# Patient Record
Sex: MALE | Race: ASIAN | NOT HISPANIC OR LATINO | URBAN - METROPOLITAN AREA
[De-identification: names, ages, dates, MRNs, and addresses within clinical notes are randomized per-mention and may not be internally consistent; named-entity substitution may affect disease eponyms.]

---

## 2023-02-20 ENCOUNTER — EMERGENCY (EMERGENCY)
Facility: HOSPITAL | Age: 53
LOS: 0 days | Discharge: HOME | End: 2023-02-20
Payer: MEDICAID

## 2023-02-20 VITALS
TEMPERATURE: 98 F | HEART RATE: 89 BPM | SYSTOLIC BLOOD PRESSURE: 144 MMHG | DIASTOLIC BLOOD PRESSURE: 94 MMHG | RESPIRATION RATE: 16 BRPM | OXYGEN SATURATION: 100 %

## 2023-02-20 VITALS
OXYGEN SATURATION: 97 % | HEART RATE: 109 BPM | DIASTOLIC BLOOD PRESSURE: 93 MMHG | RESPIRATION RATE: 16 BRPM | WEIGHT: 132.94 LBS | SYSTOLIC BLOOD PRESSURE: 134 MMHG | HEIGHT: 62 IN | TEMPERATURE: 98 F

## 2023-02-20 DIAGNOSIS — R51.9 HEADACHE, UNSPECIFIED: ICD-10-CM

## 2023-02-20 DIAGNOSIS — Z91.013 ALLERGY TO SEAFOOD: ICD-10-CM

## 2023-02-20 DIAGNOSIS — R10.13 EPIGASTRIC PAIN: ICD-10-CM

## 2023-02-20 DIAGNOSIS — Z20.822 CONTACT WITH AND (SUSPECTED) EXPOSURE TO COVID-19: ICD-10-CM

## 2023-02-20 DIAGNOSIS — I10 ESSENTIAL (PRIMARY) HYPERTENSION: ICD-10-CM

## 2023-02-20 DIAGNOSIS — R11.2 NAUSEA WITH VOMITING, UNSPECIFIED: ICD-10-CM

## 2023-02-20 DIAGNOSIS — Z86.79 PERSONAL HISTORY OF OTHER DISEASES OF THE CIRCULATORY SYSTEM: ICD-10-CM

## 2023-02-20 DIAGNOSIS — Z86.59 PERSONAL HISTORY OF OTHER MENTAL AND BEHAVIORAL DISORDERS: ICD-10-CM

## 2023-02-20 DIAGNOSIS — R79.89 OTHER SPECIFIED ABNORMAL FINDINGS OF BLOOD CHEMISTRY: ICD-10-CM

## 2023-02-20 LAB
ALBUMIN SERPL ELPH-MCNC: 4.3 G/DL — SIGNIFICANT CHANGE UP (ref 3.3–5)
ALP SERPL-CCNC: 85 U/L — SIGNIFICANT CHANGE UP (ref 40–120)
ALT FLD-CCNC: 125 U/L — HIGH (ref 12–78)
ANION GAP SERPL CALC-SCNC: 11 MMOL/L — SIGNIFICANT CHANGE UP (ref 5–17)
APTT BLD: 32.3 SEC — SIGNIFICANT CHANGE UP (ref 27.5–35.5)
AST SERPL-CCNC: 142 U/L — HIGH (ref 15–37)
BASOPHILS # BLD AUTO: 0.02 K/UL — SIGNIFICANT CHANGE UP (ref 0–0.2)
BASOPHILS NFR BLD AUTO: 0.5 % — SIGNIFICANT CHANGE UP (ref 0–2)
BILIRUB SERPL-MCNC: 0.9 MG/DL — SIGNIFICANT CHANGE UP (ref 0.2–1.2)
BUN SERPL-MCNC: 11 MG/DL — SIGNIFICANT CHANGE UP (ref 7–23)
CALCIUM SERPL-MCNC: 10.1 MG/DL — SIGNIFICANT CHANGE UP (ref 8.5–10.1)
CHLORIDE SERPL-SCNC: 98 MMOL/L — SIGNIFICANT CHANGE UP (ref 96–108)
CO2 SERPL-SCNC: 28 MMOL/L — SIGNIFICANT CHANGE UP (ref 22–31)
CREAT SERPL-MCNC: 0.67 MG/DL — SIGNIFICANT CHANGE UP (ref 0.5–1.3)
EGFR: 112 ML/MIN/1.73M2 — SIGNIFICANT CHANGE UP
EOSINOPHIL # BLD AUTO: 0.01 K/UL — SIGNIFICANT CHANGE UP (ref 0–0.5)
EOSINOPHIL NFR BLD AUTO: 0.2 % — SIGNIFICANT CHANGE UP (ref 0–6)
FLUAV AG NPH QL: SIGNIFICANT CHANGE UP
FLUBV AG NPH QL: SIGNIFICANT CHANGE UP
GLUCOSE SERPL-MCNC: 87 MG/DL — SIGNIFICANT CHANGE UP (ref 70–99)
HCT VFR BLD CALC: 42.9 % — SIGNIFICANT CHANGE UP (ref 39–50)
HGB BLD-MCNC: 14.2 G/DL — SIGNIFICANT CHANGE UP (ref 13–17)
IMM GRANULOCYTES NFR BLD AUTO: 0.2 % — SIGNIFICANT CHANGE UP (ref 0–0.9)
INR BLD: 1.08 RATIO — SIGNIFICANT CHANGE UP (ref 0.88–1.16)
LYMPHOCYTES # BLD AUTO: 1.2 K/UL — SIGNIFICANT CHANGE UP (ref 1–3.3)
LYMPHOCYTES # BLD AUTO: 28.3 % — SIGNIFICANT CHANGE UP (ref 13–44)
MCHC RBC-ENTMCNC: 31.4 PG — SIGNIFICANT CHANGE UP (ref 27–34)
MCHC RBC-ENTMCNC: 33.1 G/DL — SIGNIFICANT CHANGE UP (ref 32–36)
MCV RBC AUTO: 94.9 FL — SIGNIFICANT CHANGE UP (ref 80–100)
MONOCYTES # BLD AUTO: 0.43 K/UL — SIGNIFICANT CHANGE UP (ref 0–0.9)
MONOCYTES NFR BLD AUTO: 10.1 % — SIGNIFICANT CHANGE UP (ref 2–14)
NEUTROPHILS # BLD AUTO: 2.57 K/UL — SIGNIFICANT CHANGE UP (ref 1.8–7.4)
NEUTROPHILS NFR BLD AUTO: 60.7 % — SIGNIFICANT CHANGE UP (ref 43–77)
NRBC # BLD: 0 /100 WBCS — SIGNIFICANT CHANGE UP (ref 0–0)
PLATELET # BLD AUTO: 126 K/UL — LOW (ref 150–400)
POTASSIUM SERPL-MCNC: 3.6 MMOL/L — SIGNIFICANT CHANGE UP (ref 3.5–5.3)
POTASSIUM SERPL-SCNC: 3.6 MMOL/L — SIGNIFICANT CHANGE UP (ref 3.5–5.3)
PROT SERPL-MCNC: 8.9 GM/DL — HIGH (ref 6–8.3)
PROTHROM AB SERPL-ACNC: 13 SEC — SIGNIFICANT CHANGE UP (ref 10.5–13.4)
RBC # BLD: 4.52 M/UL — SIGNIFICANT CHANGE UP (ref 4.2–5.8)
RBC # FLD: 12.1 % — SIGNIFICANT CHANGE UP (ref 10.3–14.5)
SARS-COV-2 RNA SPEC QL NAA+PROBE: SIGNIFICANT CHANGE UP
SODIUM SERPL-SCNC: 137 MMOL/L — SIGNIFICANT CHANGE UP (ref 135–145)
TROPONIN I, HIGH SENSITIVITY RESULT: 7.6 NG/L — SIGNIFICANT CHANGE UP
WBC # BLD: 4.24 K/UL — SIGNIFICANT CHANGE UP (ref 3.8–10.5)
WBC # FLD AUTO: 4.24 K/UL — SIGNIFICANT CHANGE UP (ref 3.8–10.5)

## 2023-02-20 PROCEDURE — 70496 CT ANGIOGRAPHY HEAD: CPT | Mod: 26,MA

## 2023-02-20 PROCEDURE — 99285 EMERGENCY DEPT VISIT HI MDM: CPT

## 2023-02-20 PROCEDURE — 93010 ELECTROCARDIOGRAM REPORT: CPT

## 2023-02-20 RX ORDER — METOCLOPRAMIDE HCL 10 MG
10 TABLET ORAL ONCE
Refills: 0 | Status: COMPLETED | OUTPATIENT
Start: 2023-02-20 | End: 2023-02-20

## 2023-02-20 RX ORDER — SODIUM CHLORIDE 9 MG/ML
1000 INJECTION, SOLUTION INTRAVENOUS ONCE
Refills: 0 | Status: COMPLETED | OUTPATIENT
Start: 2023-02-20 | End: 2023-02-20

## 2023-02-20 RX ORDER — FAMOTIDINE 10 MG/ML
20 INJECTION INTRAVENOUS ONCE
Refills: 0 | Status: COMPLETED | OUTPATIENT
Start: 2023-02-20 | End: 2023-02-20

## 2023-02-20 RX ADMIN — Medication 10 MILLIGRAM(S): at 17:19

## 2023-02-20 RX ADMIN — SODIUM CHLORIDE 1000 MILLILITER(S): 9 INJECTION, SOLUTION INTRAVENOUS at 17:19

## 2023-02-20 RX ADMIN — FAMOTIDINE 20 MILLIGRAM(S): 10 INJECTION INTRAVENOUS at 17:20

## 2023-02-20 NOTE — ED PROVIDER NOTE - PATIENT PORTAL LINK FT
You can access the FollowMyHealth Patient Portal offered by [facility] by registering at the following website: [email or web address]. By joining [employer]’s FollowMyHealth portal, you will also be able to view your health information using other applications (apps) compatible with our system.

## 2023-02-20 NOTE — ED ADULT TRIAGE NOTE - HEIGHT IN CM
Verbal order received from Dr. Kenna Mcgovern to hold 2units of Humalog for ZO=279 due to poor PO intake and prior dose of 5units of lantus. 157.48

## 2023-02-20 NOTE — ED ADULT NURSE NOTE - ED STAT RN HANDOFF DETAILS
Report received from NIKO Roach. Pt AAOx4. Pt sitting comfortably in bed eating with no complaints at this time. Respirations equal and unlabored. No acute distress noted at this time.

## 2023-02-20 NOTE — ED PROVIDER NOTE - MDM PATIENT STATEMENT FOR ADDL TREATMENT
Reason for call: Follow up call from previous discussion regarding behavioral health navigation services through [facility].    Updates from Previous Discussion/New Information: Writer attempted outreach.  Writer called twice in a row. Both times, phone rang, appeared to be answered, but no one spoke. Writer could hear television in background.    Plan: Writer will reach out again in 5-7 days.  
Patient with one or more new problems requiring additional work-up/treatment.

## 2023-02-20 NOTE — ED PROVIDER NOTE - CLINICAL SUMMARY MEDICAL DECISION MAKING FREE TEXT BOX
+HA, resolved  tolerating po  feels much improved. CTs wnl.  told pt abt elevated lfts, he is aware of them and relates they are 2/2 alc abuse.   Reviewed all results and necessity for follow up. Counseled on red flags and to return for them.  Patient appears well on discharge.

## 2023-02-20 NOTE — ED PROVIDER NOTE - PROVIDER TOKENS
FREE:[LAST:[your pmd in 1-3 days],PHONE:[(   )    -],FAX:[(   )    -]],PROVIDER:[TOKEN:[[id number]],FOLLOWUP:[1-3 Days]]

## 2023-02-20 NOTE — ED PROVIDER NOTE - NS ED ROS FT
Review of Systems    Constitutional: (-) fever   Eyes/ENT: (-) vision changes  Cardiovascular: (-) chest pain, (-) syncope (-) palpitations  Respiratory: (-) cough, (-) shortness of breath  Gastrointestinal: (+) vomiting, (-) diarrhea (+) abdominal pain  Genitourinary:  (-) dysuria   Musculoskeletal: (-) neck pain, (-) back pain, (-) leg pain/swelling  Integumentary: (-) rash, (-) edema  Neurological: (+) headache (-) confusion  Hematologic: (-) easy bruising

## 2023-02-20 NOTE — ED ADULT NURSE NOTE - CAS DISCH CONDITION
Contacted patient with information about bone marrow biopsy. Reviewed all included appointments. Pt to call with any questions prior. She will ask her MD regarding holding plavix at her appt Tuesday. Stable

## 2023-02-20 NOTE — ED PROVIDER NOTE - CARE PROVIDER_API CALL
your pmd in 1-3 days,   Phone: (   )    -  Fax: (   )    -  Follow Up Time:     Andrae Bourne)  Clinical Neurophysiology; Neurology  [address] [place] [address] [address]  [place] [address]  Phone: [phone]  Fax: [phone]  Follow Up Time: 1-3 Days

## 2023-02-20 NOTE — ED PROVIDER NOTE - PHYSICAL EXAMINATION
PHYSICAL EXAM:    GENERAL: Alert, appears stated age, well appearing, non-toxic  SKIN: Warm, pink and dry.  HEAD: NC, AT  EYE: Normal lids/conjunctiva, PERRL, EOMI  ENT: Normal hearing, patent oropharynx  NECK: +supple. No meningismus, or JVD, +Trachea midline. no tenderness/step offs.   Pulm: Bilateral BS, normal resp effort, no wheezes, stridor, or retractions  CV: RRR, no M/R/G, 2+and = radial pulses  Abd: soft, non-tender, non-distended, no rebound/guarding +mild epigastric ttp. no ruq/rlq/llq/luq. no CVA tenderness.   Mskel: no erythema, cyanosis, edema. no calf tenderness  Neuro: AAOx3, no sensory/motor deficits, CN 2-12 intact. No speech slurring, pronator drift, facial asymmetry. normal finger-to-nose b/l. 5/5 strength throughout. normal gait. negative romberg.

## 2023-02-20 NOTE — ED PROVIDER NOTE - OBJECTIVE STATEMENT
52-year-old male with PMH HTN, history of brain aneurysm s/p coiling in 2019, alcohol abuse presents with moderate constant throbbing non-radiating right-sided headache which began last night.  No palliating/provoking factors.  Nausea with multiple episodes of nonbloody nonbilious vomiting and subsequent epigastric pain.  no cp, sob, back pain, urinary sxs, rash.

## 2023-07-07 NOTE — ED ADULT NURSE NOTE - NS ED NURSE DC INFO COMPLEXITY
mother
Simple: Patient demonstrates quick and easy understanding/Patient asked questions/Verbalized Understanding

## 2023-08-08 ENCOUNTER — EMERGENCY (EMERGENCY)
Facility: HOSPITAL | Age: 53
LOS: 0 days | Discharge: HOME | End: 2023-08-08
Attending: EMERGENCY MEDICINE
Payer: MEDICAID

## 2023-08-08 VITALS
HEART RATE: 97 BPM | RESPIRATION RATE: 16 BRPM | DIASTOLIC BLOOD PRESSURE: 111 MMHG | WEIGHT: 125 LBS | SYSTOLIC BLOOD PRESSURE: 162 MMHG | TEMPERATURE: 98 F | OXYGEN SATURATION: 99 % | HEIGHT: 66 IN

## 2023-08-08 VITALS
DIASTOLIC BLOOD PRESSURE: 90 MMHG | HEART RATE: 95 BPM | SYSTOLIC BLOOD PRESSURE: 153 MMHG | RESPIRATION RATE: 16 BRPM | OXYGEN SATURATION: 99 % | TEMPERATURE: 99 F

## 2023-08-08 DIAGNOSIS — F10.229 ALCOHOL DEPENDENCE WITH INTOXICATION, UNSPECIFIED: ICD-10-CM

## 2023-08-08 DIAGNOSIS — Z91.013 ALLERGY TO SEAFOOD: ICD-10-CM

## 2023-08-08 DIAGNOSIS — Z87.438 PERSONAL HISTORY OF OTHER DISEASES OF MALE GENITAL ORGANS: ICD-10-CM

## 2023-08-08 DIAGNOSIS — I10 ESSENTIAL (PRIMARY) HYPERTENSION: ICD-10-CM

## 2023-08-08 DIAGNOSIS — F43.9 REACTION TO SEVERE STRESS, UNSPECIFIED: ICD-10-CM

## 2023-08-08 PROCEDURE — 99283 EMERGENCY DEPT VISIT LOW MDM: CPT

## 2023-08-08 NOTE — SBIRT NOTE ADULT - NSSBIRTDRGPOSREINDET_GEN_A_CORE
DAST negative    Pt reports tobacco use- 1.5 pack in 2 days. Reports he has been cutting down. Has used the nicotine patch- did find it helpful.

## 2023-08-08 NOTE — ED PROVIDER NOTE - EYES, MLM
Clear bilaterally, pupils equal, round and reactive to light. EOMI intact. No nystagmus bilaterally.

## 2023-08-08 NOTE — ED ADULT NURSE NOTE - NSFALLHARMRISKINTERV_ED_ALL_ED

## 2023-08-08 NOTE — ED ADULT NURSE NOTE - CHIEF COMPLAINT QUOTE
Patient states that he was admitted in [place] for drinking and discharged today and drank a half pint of vodka.  last drink prior to EMS arrival. c/o lower abd pain, dribbling urine,  vomiting scant amount of blood in triage,

## 2023-08-08 NOTE — ED PROVIDER NOTE - NSFOLLOWUPINSTRUCTIONS_ED_ALL_ED_FT
Advance activity as tolerated.  Continue all previously prescribed medications as directed unless otherwise instructed.  Follow up with your primary care physician in 48-72 hours- bring copies of your results.  Return to the ER for worsening or persistent symptoms, and/or ANY NEW OR CONCERNING SYMPTOMS fever, chest pain, shortness of breath, abdominal pain with intractable nausea/vomiting, blood in vomit or stool, signs/symptoms of dehydration, tremors, hallucinations, neck pain/stiffness, change in vision, blurry vision, weakness/numbness/tingling in arms or legs. If you have issues obtaining follow up, please call: [phone] to obtain a doctor or specialist who takes your insurance in your area.  You may call [phone] to make an appointment with the internal medicine clinic.    Alcohol use disorder is a condition in which drinking disrupts daily life. People with this condition drink too much alcohol and cannot control their drinking.    Alcohol use disorder can cause serious problems with physical health. It can affect the brain, heart, and other internal organs. This disorder can raise the risk for certain cancers and cause problems with mental health, such as depression or anxiety.    What are the causes?  This condition is caused by drinking too much alcohol over time. Some people with this condition drink to cope with or escape from negative life events. Others drink to relieve pain or symptoms of mental illness.    What increases the risk?  You are more likely to develop this condition if:    You have a family history of alcohol use disorder.  Your culture encourages drinking to the point of becoming drunk (intoxication).  You had a mood or conduct disorder in childhood.  You have been abused.  You are an adolescent and you:    Have poor performance in school.  Have poor supervision or guidance.  Act on impulse and like taking risks.    What are the signs or symptoms?  Symptoms of this condition include:    Drinking more than you want to.  Trying several times without success to drink less.  Spending a lot of time thinking about alcohol, getting alcohol, drinking, or recovering from drinking.  Continuing to drink even when it is causing serious problems in your daily life.  Drinking when it is dangerous to drink, such as before driving a car.  Needing more and more alcohol to get the same effect you want (building up tolerance).  Having symptoms of withdrawal when you stop drinking. Withdrawal symptoms may include:    Trouble sleeping, leading to tiredness (fatigue).  Mood swings of depression and anxiety.  Physical symptoms, such as a fast heart rate, rapid breathing, high blood pressure (hypertension), fever, cold sweats, or nausea.  Seizures.  Severe confusion.  Feeling or seeing things that are not there (hallucinations).  Shaking movements that you cannot control (tremors).    How is this diagnosed?  This condition is diagnosed with an assessment. Your health care provider may start by asking three or four questions about your drinking, or he or she may give you a simple test to take. This helps to get clear information from you.    You may also have a physical exam or lab tests. You may be referred to a substance abuse counselor.    How is this treated?     With education, some people with alcohol use disorder are able to reduce their drinking. Many with this disorder cannot change their drinking behavior on their own and need help from substance use specialists. These specialists are counselors who can help diagnose how severe your disorder is and what type of treatment you need. Treatments may include:    Detoxification. Detoxification involves quitting drinking with supervision and direction of health care providers. Your health care provider may prescribe prescription medicines within the first week to help lessen withdrawal symptoms. Alcohol withdrawal can be dangerous and life-threatening. Detoxification may be provided in a home, community, or primary care setting, or in a hospital or substance use treatment facility.  Counseling. This may involve motivational interviewing (MI), family therapy, or cognitive behavioral therapy (CBT). It is provided by substance use treatment counselors or professional therapists. A counselor can address the things you can do to change your drinking behavior and how to maintain the changes. Talk therapy aims to:    Identify your positive motivations to change.  Identify and avoid the things that trigger your drinking.  Help you learn how to plan your behavior change.  Develop support systems that can help you sustain the change.  Medicines. Medicines can help treat this disorder by:    Decreasing cravings.  Decreasing the positive feeling you have when you drink.  Causing an uncomfortable physical reaction when you drink (aversion therapy).  [place] help groups such as Alcoholics Anonymous (AA). These groups are led by people who have quit drinking. The groups provide emotional support, advice, and guidance.    Some people with this condition benefit from a combination of treatments provided by specialized substance use treatment centers.    Follow these instructions at home:         Medicines    Take over-the-counter and prescription medicines only as told by your health care provider.  Ask before starting any new medicines, herbs, or supplements.        General instructions    Ask friends and family members to support your choice to stay sober.  Avoid situations where alcohol is served.  Create a plan to deal with tempting situations.  Attend support groups regularly.  Practice hobbies or activities you enjoy.  Do not drink and drive.  Keep all follow-up visits as told by your health care provider. This is important.    How is this prevented?  If you drink alcohol:    Limit how much you use to:    0–1 drink a day for nonpregnant women.  0–2 drinks a day for men.  Be aware of how much alcohol is in your drink. In the [place] one drink equals one 12 oz bottle of beer (355 mL), one 5 oz glass of wine (148 mL), or one 1½ oz glass of hard liquor (44 mL).  If you have a mental health condition, seek treatment. Develop a healthy lifestyle through:    Meditation or deep breathing.  Exercise.  Spending time in nature.   Listening to music.  Talking with a trusted friend or family member.  If you are an adolescent:    Do not drink alcohol. Avoid gatherings where you might be tempted.  Do not be afraid to say no if someone offers you alcohol. Speak up about why you do not want to drink. Set a positive example for others around you by not drinking.  Build relationships with friends who do not drink.    Where to find more information  Substance Abuse and Mental Health Services Administration: samhsa.gov  Alcoholics Anonymous: aa.org    Contact a health care provider if:  You cannot take your medicines as told.  Your symptoms get worse or you experience symptoms of withdrawal when you stop drinking.  You start drinking again (relapse) and your symptoms get worse.    Get help right away if:  You have thoughts about hurting yourself or others.    If you ever feel like you may hurt yourself or others, or have thoughts about taking your own life, get help right away. Go to your nearest emergency department or:     Call your local emergency services (187 in the [place]  Call a suicide crisis helpline, such as the National Suicide Prevention Lifeline at 1-759.144.9942. This is open 24 hours a day in the [place]  Text the Crisis Text Line at 049709 (in the [place]    Summary  Alcohol use disorder is a condition in which drinking disrupts daily life. People with this condition drink too much alcohol and cannot control their drinking.  Treatment may include detoxification, counseling, medicines, and support groups.  Ask friends and family members to support you. Avoid situations where alcohol is served.  Get help right away if you have thoughts about hurting yourself or others.    ADDITIONAL NOTES AND INSTRUCTIONS    Please follow up with your Primary MD in 24-48 hr.  Seek immediate medical care for any new/worsening signs or symptoms.

## 2023-08-08 NOTE — ED PROVIDER NOTE - ENMT, MLM
Airway patent, Nasal mucosa clear. Mouth with normal mucosa. Throat has no vesicles, no oropharyngeal exudates and uvula is midline. No tongue fasciculations. Head atraumatic, normocephalic. Airway patent, Nasal mucosa clear. Mouth with normal mucosa. Throat has no vesicles, no oropharyngeal exudates and uvula is midline. No tongue fasciculations.

## 2023-08-08 NOTE — ED ADULT TRIAGE NOTE - PATIENT ON (OXYGEN DELIVERY METHOD)
room air Island Pedicle Flap Text: The defect edges were debeveled with a #15 scalpel blade. Given the location of the defect, shape of the defect and the proximity to free margins an island pedicle advancement flap was deemed most appropriate. Using a sterile surgical marker, an appropriate advancement flap was drawn incorporating the defect, outlining the appropriate donor tissue and placing the expected incisions within the relaxed skin tension lines where possible. The area thus outlined was incised deep to adipose tissue with a #15 scalpel blade. The skin margins were undermined to an appropriate distance in all directions around the primary defect and laterally outward around the island pedicle utilizing iris scissors.  There was minimal undermining beneath the pedicle flap. Following this, the flap was placed into the primary defect and sutured into place.

## 2023-08-08 NOTE — ED PROVIDER NOTE - PATIENT PORTAL LINK FT
You can access the FollowMyHealth Patient Portal offered by [facility] by registering at the following website: [email or web address]. By joining [employer]’s FollowMyHealth portal, you will also be able to view your health information using other applications (apps) compatible with our system.

## 2023-08-08 NOTE — ED PROVIDER NOTE - NS ED ATTENDING STATEMENT MOD
This was a shared visit with the BHUPENDRA. I reviewed and verified the documentation and independently performed the documented:

## 2023-08-08 NOTE — ED PROVIDER NOTE - PROGRESS NOTE DETAILS
YADY Villalpando: Discussed cause with Christin (SBIRT). YADY Villalpando: Discussed patient case with Christin (SBIRT) - who will reach out to patient for further steps and planning for outpatient treatment referrals.

## 2023-08-08 NOTE — ED ADULT NURSE NOTE - OBJECTIVE STATEMENT
Pt stated he was admitted @ [facility] for drinking and discharged today and drank a half pint of vodka. pt last drink prior to EMS arrival. c/o lower abd pain, dribbling urine.

## 2023-08-08 NOTE — ED PROVIDER NOTE - MUSCULOSKELETAL, MLM
Spine appears normal, range of motion is not limited, no muscle or joint tenderness. Moving all extremities equally, full strength against resistance, sensation intact.

## 2023-08-08 NOTE — ED PROVIDER NOTE - CLINICAL SUMMARY MEDICAL DECISION MAKING FREE TEXT BOX
52M with PMH HTN, Alcohol Use Disorder who presents to ED for alcohol intoxication. Pt states that he was in [facility] overnight for alcohol intoxication, left the hospital this morning, pt then went home and instead of picking up prescribed medications he drank "half a pint" of rum. Pt expressing that he has a lot of stressors and has no other option but to drink rum, states that he knows he needs help to stop drinking, no SI/HI. Patient currently afebrile, hemodynamically stable, spO2 100%. Based on history and physical, differentials include but are not limited to . Plan to assess patient for acute pathology as listed above with . Will administer medications for symptomatic relief, follow-up on results, and reassess. 52M with PMH HTN, Alcohol Use Disorder who presents to ED for alcohol intoxication. Pt states that he was in [facility] overnight for alcohol intoxication, left the hospital this morning, pt then went home and instead of picking up prescribed medications he drank "half a pint" of rum. Pt expressing that he has a lot of stressors and has no other option but to drink rum, states that he knows he needs help to stop drinking, no SI/HI. Patient currently afebrile, hemodynamically stable, spO2 100%. On PE - pt overall well-appearing, mildly intoxication, no tongue fasciculations, hand tremors, or other signs/symptoms of alcohol withdrawal. Based on history and physical, differentials include but are not limited to alcohol use disorder, mild alcohol intoxication. Patient expressing the need for help with quitting alcohol use - consulted Christin from SBIRT for further recommendations.

## 2023-08-08 NOTE — SBIRT NOTE ADULT - NSSBIRTALCPASSREFTXDET_GEN_A_CORE
Provided SBIRT services: Full screen positive. Referral to Treatment attempted. Screening results were reviewed with the patient and patient was provided information about healthy guidelines and potential negative consequences associated with level of risk. Motivation and readiness to reduce or stop use was discussed and goals and activities to make changes were suggested/offered. Options discussed for further evaluation and treatment, but referral to treatment was not completed because: Patient reports he received referrals from [facility] for outpatient counseling that he will outreached. Declined help in making appt and for further resources/support. Patient agreed to SBIRT flyer being sent to his email <[email or web address]>- email sent

## 2023-08-08 NOTE — ED PROVIDER NOTE - OBJECTIVE STATEMENT
52M with PMH HTN, Alcohol Use Disorder who presents to ED for alcohol intoxication. Pt states that he was in [facility] overnight for alcohol intoxication, left the hospital this morning, pt then went home and instead of picking up prescribed medications he drank "half a pint" of rum. Pt expressing that he has a lot of stressors and has no other option but to drink rum, states that he knows he needs help to stop drinking, no SI/HI. Pt reporting that he is currently asymptomatic - denies fever, chills, chest pain, shortness of breath, abdominal pain, N/V/D, dysuria, urinary frequency/urgency, extremity weakness/numbness/tingling, lightheadedness, dizziness, or headaches.

## 2023-09-20 ENCOUNTER — EMERGENCY (EMERGENCY)
Facility: HOSPITAL | Age: 53
LOS: 0 days | Discharge: HOME | End: 2023-09-21
Attending: STUDENT IN AN ORGANIZED HEALTH CARE EDUCATION/TRAINING PROGRAM
Payer: MEDICAID

## 2023-09-20 VITALS
RESPIRATION RATE: 18 BRPM | OXYGEN SATURATION: 99 % | WEIGHT: 134.92 LBS | HEIGHT: 66 IN | DIASTOLIC BLOOD PRESSURE: 86 MMHG | HEART RATE: 99 BPM | SYSTOLIC BLOOD PRESSURE: 151 MMHG | TEMPERATURE: 97 F

## 2023-09-20 DIAGNOSIS — F10.129 ALCOHOL ABUSE WITH INTOXICATION, UNSPECIFIED: ICD-10-CM

## 2023-09-20 DIAGNOSIS — R45.1 RESTLESSNESS AND AGITATION: ICD-10-CM

## 2023-09-20 DIAGNOSIS — Z91.013 ALLERGY TO SEAFOOD: ICD-10-CM

## 2023-09-20 DIAGNOSIS — I10 ESSENTIAL (PRIMARY) HYPERTENSION: ICD-10-CM

## 2023-09-20 DIAGNOSIS — Z87.438 PERSONAL HISTORY OF OTHER DISEASES OF MALE GENITAL ORGANS: ICD-10-CM

## 2023-09-20 LAB
ALBUMIN SERPL ELPH-MCNC: 3.4 G/DL — SIGNIFICANT CHANGE UP (ref 3.3–5)
ALP SERPL-CCNC: 72 U/L — SIGNIFICANT CHANGE UP (ref 40–120)
ALT FLD-CCNC: 95 U/L — HIGH (ref 12–78)
ANION GAP SERPL CALC-SCNC: 8 MMOL/L — SIGNIFICANT CHANGE UP (ref 5–17)
APAP SERPL-MCNC: < 2 UG/ML (ref 10–30)
AST SERPL-CCNC: 95 U/L — HIGH (ref 15–37)
BASOPHILS # BLD AUTO: 0.04 K/UL — SIGNIFICANT CHANGE UP (ref 0–0.2)
BASOPHILS NFR BLD AUTO: 0.7 % — SIGNIFICANT CHANGE UP (ref 0–2)
BILIRUB SERPL-MCNC: 0.2 MG/DL — SIGNIFICANT CHANGE UP (ref 0.2–1.2)
BUN SERPL-MCNC: 8 MG/DL — SIGNIFICANT CHANGE UP (ref 7–23)
CALCIUM SERPL-MCNC: 8.1 MG/DL — LOW (ref 8.5–10.1)
CHLORIDE SERPL-SCNC: 113 MMOL/L — HIGH (ref 96–108)
CO2 SERPL-SCNC: 27 MMOL/L — SIGNIFICANT CHANGE UP (ref 22–31)
CREAT SERPL-MCNC: 0.73 MG/DL — SIGNIFICANT CHANGE UP (ref 0.5–1.3)
EGFR: 109 ML/MIN/1.73M2 — SIGNIFICANT CHANGE UP
EOSINOPHIL # BLD AUTO: 0.12 K/UL — SIGNIFICANT CHANGE UP (ref 0–0.5)
EOSINOPHIL NFR BLD AUTO: 2.1 % — SIGNIFICANT CHANGE UP (ref 0–6)
ETHANOL SERPL-MCNC: 363 MG/DL — HIGH (ref 0–10)
GLUCOSE SERPL-MCNC: 80 MG/DL — SIGNIFICANT CHANGE UP (ref 70–99)
HCT VFR BLD CALC: 40.1 % — SIGNIFICANT CHANGE UP (ref 39–50)
HGB BLD-MCNC: 12.8 G/DL — LOW (ref 13–17)
IMM GRANULOCYTES NFR BLD AUTO: 0.2 % — SIGNIFICANT CHANGE UP (ref 0–0.9)
LYMPHOCYTES # BLD AUTO: 3.63 K/UL — HIGH (ref 1–3.3)
LYMPHOCYTES # BLD AUTO: 64.5 % — HIGH (ref 13–44)
MCHC RBC-ENTMCNC: 31.9 G/DL — LOW (ref 32–36)
MCHC RBC-ENTMCNC: 32.1 PG — SIGNIFICANT CHANGE UP (ref 27–34)
MCV RBC AUTO: 100.5 FL — HIGH (ref 80–100)
MONOCYTES # BLD AUTO: 0.24 K/UL — SIGNIFICANT CHANGE UP (ref 0–0.9)
MONOCYTES NFR BLD AUTO: 4.3 % — SIGNIFICANT CHANGE UP (ref 2–14)
NEUTROPHILS # BLD AUTO: 1.59 K/UL — LOW (ref 1.8–7.4)
NEUTROPHILS NFR BLD AUTO: 28.2 % — LOW (ref 43–77)
NRBC # BLD: 0 /100 WBCS — SIGNIFICANT CHANGE UP (ref 0–0)
PLATELET # BLD AUTO: 141 K/UL — LOW (ref 150–400)
POTASSIUM SERPL-MCNC: 3.7 MMOL/L — SIGNIFICANT CHANGE UP (ref 3.5–5.3)
POTASSIUM SERPL-SCNC: 3.7 MMOL/L — SIGNIFICANT CHANGE UP (ref 3.5–5.3)
PROT SERPL-MCNC: 7.7 GM/DL — SIGNIFICANT CHANGE UP (ref 6–8.3)
RBC # BLD: 3.99 M/UL — LOW (ref 4.2–5.8)
RBC # FLD: 12.8 % — SIGNIFICANT CHANGE UP (ref 10.3–14.5)
SALICYLATES SERPL-MCNC: <1.7 MG/DL — LOW (ref 2.8–20)
SODIUM SERPL-SCNC: 148 MMOL/L — HIGH (ref 135–145)
WBC # BLD: 5.63 K/UL — SIGNIFICANT CHANGE UP (ref 3.8–10.5)
WBC # FLD AUTO: 5.63 K/UL — SIGNIFICANT CHANGE UP (ref 3.8–10.5)

## 2023-09-20 PROCEDURE — 99285 EMERGENCY DEPT VISIT HI MDM: CPT

## 2023-09-20 RX ORDER — HALOPERIDOL DECANOATE 100 MG/ML
5 INJECTION INTRAMUSCULAR ONCE
Refills: 0 | Status: COMPLETED | OUTPATIENT
Start: 2023-09-20 | End: 2023-09-20

## 2023-09-20 RX ADMIN — Medication 2 MILLIGRAM(S): at 20:05

## 2023-09-20 RX ADMIN — HALOPERIDOL DECANOATE 5 MILLIGRAM(S): 100 INJECTION INTRAMUSCULAR at 20:05

## 2023-09-20 NOTE — ED ADULT TRIAGE NOTE - CHIEF COMPLAINT QUOTE
pt from home, intox, c/o vomiting. awake, alert, tearful in triage. drank 1 bottle of alcohol today. states drank all week long. "a lot of things happened. usually drinks a 6 pack of beer on mondays. pt from home, intox, c/o vomiting. awake, alert, tearful in triage. drank 1 bottle of alcohol today. states drank all week long. "a lot of things happened. usually drinks a 6 pack of beer on mondays. pt further states "I want to commit suicide" states lives in [place] alone, family lives in various [place]. attempted suicide x 3 years ago by drinking 'poison' ended up in hospital, either [facility] or [facility].

## 2023-09-20 NOTE — ED ADULT NURSE NOTE - CHIEF COMPLAINT QUOTE
pt from home, intox, c/o vomiting. awake, alert, tearful in triage. drank 1 bottle of alcohol today. states drank all week long. "a lot of things happened. usually drinks a 6 pack of beer on mondays.

## 2023-09-20 NOTE — ED ADULT NURSE NOTE - OBJECTIVE STATEMENT
pt s/p Etoh intox from home, c/o N/V. pt stated he drank X3 pints of rum x3 days including today. pt stated drank all week long because he has been going through allot. pt stated he usually drinks a 6 pack of beer on mondays. pt also c/o 10/10 headache, but denies taking any medication WARREN.  pt has a PMH of HBP.

## 2023-09-20 NOTE — ED ADULT NURSE NOTE - NSFALLUNIVINTERV_ED_ALL_ED
Bed/Stretcher in lowest position, wheels locked, appropriate side rails in place/Call bell, personal items and telephone in reach/Instruct patient to call for assistance before getting out of bed/chair/stretcher/Non-slip footwear applied when patient is off stretcher/[place] to call system/Physically safe environment - no spills, clutter or unnecessary equipment/Purposeful proactive rounding/Room/bathroom lighting operational, light cord in reach

## 2023-09-21 VITALS
TEMPERATURE: 99 F | RESPIRATION RATE: 18 BRPM | OXYGEN SATURATION: 93 % | SYSTOLIC BLOOD PRESSURE: 159 MMHG | DIASTOLIC BLOOD PRESSURE: 94 MMHG | HEART RATE: 102 BPM

## 2023-09-21 PROBLEM — I10 ESSENTIAL (PRIMARY) HYPERTENSION: Chronic | Status: ACTIVE | Noted: 2023-08-08

## 2023-09-21 PROBLEM — N40.0 BENIGN PROSTATIC HYPERPLASIA WITHOUT LOWER URINARY TRACT SYMPTOMS: Chronic | Status: ACTIVE | Noted: 2023-08-08

## 2023-09-21 RX ORDER — ACETAMINOPHEN 500 MG
650 TABLET ORAL ONCE
Refills: 0 | Status: COMPLETED | OUTPATIENT
Start: 2023-09-21 | End: 2023-09-21

## 2023-09-21 RX ADMIN — Medication 650 MILLIGRAM(S): at 11:22

## 2023-09-21 NOTE — ED ADULT NURSE REASSESSMENT NOTE - NS ED NURSE REASSESS COMMENT FT1
Received report from primary RN Cathleen. Pt AOx4 and ambulatory with steady gait. Pt in NAD, respirations equal and unlabored. Security at bedside returning pt belongings. Safety measures maintained. Pt awaiting for medicaid cab.

## 2023-09-21 NOTE — ED PROVIDER NOTE - PROGRESS NOTE DETAILS
sleeping Patient received on sign-out from Dr. Ge    52 M presenting to the ED after alcohol intoxication. s/p sedation. Patient now awake, alert and apologetic. Clinically sober. Pt denies SI/HI or hallucinations. Patient safe for discharge with outpatient follow-up   dispo home

## 2023-09-21 NOTE — ED PROVIDER NOTE - SKIN, MLM
----- Message from Myra Greenwood MD sent at 7/14/2020  1:32 PM CDT -----  Add liver panel to the lab drawn   Skin normal color for race, warm, dry and intact. No evidence of rash.

## 2023-09-21 NOTE — ED PROVIDER NOTE - OBJECTIVE STATEMENT
52 year old male with h/o htn and bph presents today biba from home intoxicated,  drinking all week daily, admits to having issues at this time    pt did become verbally abusive and agitate requiring sedation, given haldol and ative 52 year old male with h/o htn and bph presents today biba from home intoxicated,  drinking all week daily, admits to having issues at this time    pt did become verbally abusive and agitate requiring sedation, given haldol and ativan

## 2023-09-21 NOTE — ED PROVIDER NOTE - CLINICAL SUMMARY MEDICAL DECISION MAKING FREE TEXT BOX
alcohol intoxication pt presents intoxicated and agitation requiring sedation, labs obtained, pt examined, no signs of trauma, will disposition pending sobriety and reassessment

## 2023-09-21 NOTE — ED PROVIDER NOTE - PATIENT PORTAL LINK FT
You can access the FollowMyHealth Patient Portal offered by [facility] by registering at the following website: [email or web address]. By joining [employer]’s FollowMyHealth portal, you will also be able to view your health information using other applications (apps) compatible with our system.

## 2023-09-21 NOTE — SBIRT NOTE ADULT - NSSBIRTALCACTIVEREFTXDET_GEN_A_CORE
Provided SBIRT services: Full screen positive. Referral to Treatment Performed. Screening results were reviewed with the patient and patient was provided information about healthy guidelines and potential negative consequences associated with level of risk. Motivation and readiness to reduce or stop use was discussed and goals and activities to make changes were suggested/offered. Referral for complete assessment and level of care determination at a certified treatment facility was completed by contacting the treatment facility via phone

## 2024-08-15 ENCOUNTER — INPATIENT (INPATIENT)
Facility: HOSPITAL | Age: 54
LOS: 1 days | Discharge: HOME | End: 2024-08-17
Attending: INTERNAL MEDICINE | Admitting: INTERNAL MEDICINE
Payer: MEDICAID

## 2024-08-15 VITALS
OXYGEN SATURATION: 99 % | RESPIRATION RATE: 18 BRPM | WEIGHT: 134.92 LBS | DIASTOLIC BLOOD PRESSURE: 87 MMHG | HEART RATE: 90 BPM | TEMPERATURE: 98 F | SYSTOLIC BLOOD PRESSURE: 122 MMHG | HEIGHT: 64 IN

## 2024-08-15 LAB
ALBUMIN SERPL ELPH-MCNC: 3.5 G/DL — SIGNIFICANT CHANGE UP (ref 3.3–5)
ALBUMIN SERPL ELPH-MCNC: 3.7 G/DL — SIGNIFICANT CHANGE UP (ref 3.3–5)
ALP SERPL-CCNC: 74 U/L — SIGNIFICANT CHANGE UP (ref 40–120)
ALP SERPL-CCNC: 77 U/L — SIGNIFICANT CHANGE UP (ref 40–120)
ALT FLD-CCNC: 115 U/L — HIGH (ref 12–78)
ALT FLD-CCNC: 123 U/L — HIGH (ref 12–78)
ANION GAP SERPL CALC-SCNC: 8 MMOL/L — SIGNIFICANT CHANGE UP (ref 5–17)
ANION GAP SERPL CALC-SCNC: 9 MMOL/L — SIGNIFICANT CHANGE UP (ref 5–17)
APPEARANCE UR: CLEAR — SIGNIFICANT CHANGE UP
AST SERPL-CCNC: 109 U/L — HIGH (ref 15–37)
AST SERPL-CCNC: 113 U/L — HIGH (ref 15–37)
BACTERIA # UR AUTO: ABNORMAL /HPF
BILIRUB DIRECT SERPL-MCNC: 0.1 MG/DL — SIGNIFICANT CHANGE UP (ref 0–0.3)
BILIRUB INDIRECT FLD-MCNC: 0.2 MG/DL — SIGNIFICANT CHANGE UP (ref 0.2–1)
BILIRUB SERPL-MCNC: 0.2 MG/DL — SIGNIFICANT CHANGE UP (ref 0.2–1.2)
BILIRUB SERPL-MCNC: 0.3 MG/DL — SIGNIFICANT CHANGE UP (ref 0.2–1.2)
BILIRUB UR-MCNC: NEGATIVE — SIGNIFICANT CHANGE UP
BUN SERPL-MCNC: 10 MG/DL — SIGNIFICANT CHANGE UP (ref 7–23)
BUN SERPL-MCNC: 7 MG/DL — SIGNIFICANT CHANGE UP (ref 7–23)
CALCIUM SERPL-MCNC: 8.8 MG/DL — SIGNIFICANT CHANGE UP (ref 8.5–10.1)
CALCIUM SERPL-MCNC: 9.4 MG/DL — SIGNIFICANT CHANGE UP (ref 8.5–10.1)
CHLORIDE SERPL-SCNC: 104 MMOL/L — SIGNIFICANT CHANGE UP (ref 96–108)
CHLORIDE SERPL-SCNC: 108 MMOL/L — SIGNIFICANT CHANGE UP (ref 96–108)
CO2 SERPL-SCNC: 26 MMOL/L — SIGNIFICANT CHANGE UP (ref 22–31)
CO2 SERPL-SCNC: 27 MMOL/L — SIGNIFICANT CHANGE UP (ref 22–31)
COLOR SPEC: YELLOW — SIGNIFICANT CHANGE UP
CREAT SERPL-MCNC: 0.69 MG/DL — SIGNIFICANT CHANGE UP (ref 0.5–1.3)
CREAT SERPL-MCNC: 0.77 MG/DL — SIGNIFICANT CHANGE UP (ref 0.5–1.3)
DIFF PNL FLD: NEGATIVE — SIGNIFICANT CHANGE UP
EGFR: 107 ML/MIN/1.73M2 — SIGNIFICANT CHANGE UP
EGFR: 107 ML/MIN/1.73M2 — SIGNIFICANT CHANGE UP
EGFR: 111 ML/MIN/1.73M2 — SIGNIFICANT CHANGE UP
EGFR: 111 ML/MIN/1.73M2 — SIGNIFICANT CHANGE UP
EPI CELLS # UR: PRESENT
ETHANOL SERPL-MCNC: 293 MG/DL — HIGH (ref 0–10)
GLUCOSE SERPL-MCNC: 110 MG/DL — HIGH (ref 70–99)
GLUCOSE SERPL-MCNC: 87 MG/DL — SIGNIFICANT CHANGE UP (ref 70–99)
GLUCOSE UR QL: NEGATIVE MG/DL — SIGNIFICANT CHANGE UP
HCT VFR BLD CALC: 42.2 % — SIGNIFICANT CHANGE UP (ref 39–50)
HGB BLD-MCNC: 13.9 G/DL — SIGNIFICANT CHANGE UP (ref 13–17)
KETONES UR-MCNC: NEGATIVE MG/DL — SIGNIFICANT CHANGE UP
LACTATE SERPL-SCNC: 2.6 MMOL/L — HIGH (ref 0.7–2)
LEUKOCYTE ESTERASE UR-ACNC: NEGATIVE — SIGNIFICANT CHANGE UP
LIDOCAIN IGE QN: 25 U/L — SIGNIFICANT CHANGE UP (ref 13–75)
MAGNESIUM SERPL-MCNC: 1.7 MG/DL — SIGNIFICANT CHANGE UP (ref 1.6–2.6)
MAGNESIUM SERPL-MCNC: 1.8 MG/DL — SIGNIFICANT CHANGE UP (ref 1.6–2.6)
MCHC RBC-ENTMCNC: 31.2 PG — SIGNIFICANT CHANGE UP (ref 27–34)
MCHC RBC-ENTMCNC: 32.9 G/DL — SIGNIFICANT CHANGE UP (ref 32–36)
MCV RBC AUTO: 94.6 FL — SIGNIFICANT CHANGE UP (ref 80–100)
NITRITE UR-MCNC: NEGATIVE — SIGNIFICANT CHANGE UP
NRBC # BLD: 0 /100 WBCS — SIGNIFICANT CHANGE UP (ref 0–0)
NRBC BLD-RTO: 0 /100 WBCS — SIGNIFICANT CHANGE UP (ref 0–0)
PH UR: 6.5 — SIGNIFICANT CHANGE UP (ref 5–8)
PHOSPHATE SERPL-MCNC: 3.6 MG/DL — SIGNIFICANT CHANGE UP (ref 2.5–4.5)
PHOSPHATE SERPL-MCNC: 3.9 MG/DL — SIGNIFICANT CHANGE UP (ref 2.5–4.5)
PLATELET # BLD AUTO: 96 K/UL — LOW (ref 150–400)
POTASSIUM SERPL-MCNC: 2.9 MMOL/L — CRITICAL LOW (ref 3.5–5.3)
POTASSIUM SERPL-MCNC: 3.5 MMOL/L — SIGNIFICANT CHANGE UP (ref 3.5–5.3)
POTASSIUM SERPL-SCNC: 2.9 MMOL/L — CRITICAL LOW (ref 3.5–5.3)
POTASSIUM SERPL-SCNC: 3.5 MMOL/L — SIGNIFICANT CHANGE UP (ref 3.5–5.3)
PROT SERPL-MCNC: 7.7 GM/DL — SIGNIFICANT CHANGE UP (ref 6–8.3)
PROT SERPL-MCNC: 8.2 GM/DL — SIGNIFICANT CHANGE UP (ref 6–8.3)
PROT UR-MCNC: NEGATIVE MG/DL — SIGNIFICANT CHANGE UP
RBC # BLD: 4.46 M/UL — SIGNIFICANT CHANGE UP (ref 4.2–5.8)
RBC # FLD: 13.2 % — SIGNIFICANT CHANGE UP (ref 10.3–14.5)
RBC CASTS # UR COMP ASSIST: 2 /HPF — SIGNIFICANT CHANGE UP (ref 0–4)
SODIUM SERPL-SCNC: 140 MMOL/L — SIGNIFICANT CHANGE UP (ref 135–145)
SODIUM SERPL-SCNC: 142 MMOL/L — SIGNIFICANT CHANGE UP (ref 135–145)
SP GR SPEC: 1 — SIGNIFICANT CHANGE UP (ref 1–1.03)
TROPONIN I, HIGH SENSITIVITY RESULT: 10.1 NG/L — SIGNIFICANT CHANGE UP
UROBILINOGEN FLD QL: 0.2 MG/DL — SIGNIFICANT CHANGE UP (ref 0.2–1)
WBC # BLD: 5.42 K/UL — SIGNIFICANT CHANGE UP (ref 3.8–10.5)
WBC # FLD AUTO: 5.42 K/UL — SIGNIFICANT CHANGE UP (ref 3.8–10.5)
WBC UR QL: 2 /HPF — SIGNIFICANT CHANGE UP (ref 0–5)

## 2024-08-15 PROCEDURE — 99222 1ST HOSP IP/OBS MODERATE 55: CPT

## 2024-08-15 PROCEDURE — 71046 X-RAY EXAM CHEST 2 VIEWS: CPT | Mod: 26

## 2024-08-15 PROCEDURE — 93010 ELECTROCARDIOGRAM REPORT: CPT

## 2024-08-15 PROCEDURE — 99285 EMERGENCY DEPT VISIT HI MDM: CPT

## 2024-08-15 PROCEDURE — 70450 CT HEAD/BRAIN W/O DYE: CPT | Mod: 26,MC

## 2024-08-15 PROCEDURE — 74177 CT ABD & PELVIS W/CONTRAST: CPT | Mod: 26,MC

## 2024-08-15 RX ORDER — MELATONIN 5 MG
3 TABLET ORAL AT BEDTIME
Refills: 0 | Status: DISCONTINUED | OUTPATIENT
Start: 2024-08-15 | End: 2024-08-17

## 2024-08-15 RX ORDER — ONDANSETRON HCL/PF 4 MG/2 ML
4 VIAL (ML) INJECTION EVERY 8 HOURS
Refills: 0 | Status: DISCONTINUED | OUTPATIENT
Start: 2024-08-15 | End: 2024-08-17

## 2024-08-15 RX ORDER — ENOXAPARIN SODIUM 100 MG/ML
40 INJECTION SUBCUTANEOUS EVERY 24 HOURS
Refills: 0 | Status: DISCONTINUED | OUTPATIENT
Start: 2024-08-15 | End: 2024-08-17

## 2024-08-15 RX ORDER — MAGNESIUM, ALUMINUM HYDROXIDE 200-200 MG
30 TABLET,CHEWABLE ORAL EVERY 4 HOURS
Refills: 0 | Status: DISCONTINUED | OUTPATIENT
Start: 2024-08-15 | End: 2024-08-17

## 2024-08-15 RX ORDER — CHLORDIAZEPOXIDE HCL 10 MG
25 CAPSULE ORAL ONCE
Refills: 0 | Status: DISCONTINUED | OUTPATIENT
Start: 2024-08-15 | End: 2024-08-15

## 2024-08-15 RX ORDER — LORAZEPAM 4 MG/ML
2 VIAL (ML) INJECTION
Refills: 0 | Status: DISCONTINUED | OUTPATIENT
Start: 2024-08-15 | End: 2024-08-17

## 2024-08-15 RX ORDER — LORAZEPAM 4 MG/ML
2 VIAL (ML) INJECTION ONCE
Refills: 0 | Status: DISCONTINUED | OUTPATIENT
Start: 2024-08-15 | End: 2024-08-15

## 2024-08-15 RX ADMIN — Medication 100 MILLIEQUIVALENT(S): at 22:01

## 2024-08-15 RX ADMIN — Medication 25 MILLIGRAM(S): at 19:49

## 2024-08-15 RX ADMIN — Medication 100 MILLIEQUIVALENT(S): at 19:48

## 2024-08-15 RX ADMIN — Medication 2 MILLIGRAM(S): at 17:27

## 2024-08-15 RX ADMIN — Medication 2 MILLIGRAM(S): at 22:30

## 2024-08-15 RX ADMIN — Medication 100 MILLIEQUIVALENT(S): at 17:26

## 2024-08-15 RX ADMIN — Medication 1000 MILLILITER(S): at 17:04

## 2024-08-15 RX ADMIN — Medication 100 MILLILITER(S): at 22:01

## 2024-08-15 RX ADMIN — Medication 100 MILLIGRAM(S): at 17:04

## 2024-08-16 DIAGNOSIS — F10.929 ALCOHOL USE, UNSPECIFIED WITH INTOXICATION, UNSPECIFIED: ICD-10-CM

## 2024-08-16 DIAGNOSIS — F10.239 ALCOHOL DEPENDENCE WITH WITHDRAWAL, UNSPECIFIED: ICD-10-CM

## 2024-08-16 DIAGNOSIS — R74.01 ELEVATION OF LEVELS OF LIVER TRANSAMINASE LEVELS: ICD-10-CM

## 2024-08-16 DIAGNOSIS — E87.6 HYPOKALEMIA: ICD-10-CM

## 2024-08-16 DIAGNOSIS — E83.42 HYPOMAGNESEMIA: ICD-10-CM

## 2024-08-16 LAB
ALBUMIN SERPL ELPH-MCNC: 3.4 G/DL — SIGNIFICANT CHANGE UP (ref 3.3–5)
ALP SERPL-CCNC: 75 U/L — SIGNIFICANT CHANGE UP (ref 40–120)
ALT FLD-CCNC: 108 U/L — HIGH (ref 12–78)
ANION GAP SERPL CALC-SCNC: 5 MMOL/L — SIGNIFICANT CHANGE UP (ref 5–17)
ANION GAP SERPL CALC-SCNC: 6 MMOL/L — SIGNIFICANT CHANGE UP (ref 5–17)
AST SERPL-CCNC: 107 U/L — HIGH (ref 15–37)
BASOPHILS # BLD AUTO: 0.02 K/UL — SIGNIFICANT CHANGE UP (ref 0–0.2)
BASOPHILS NFR BLD AUTO: 0.5 % — SIGNIFICANT CHANGE UP (ref 0–2)
BILIRUB SERPL-MCNC: 0.8 MG/DL — SIGNIFICANT CHANGE UP (ref 0.2–1.2)
BUN SERPL-MCNC: 10 MG/DL — SIGNIFICANT CHANGE UP (ref 7–23)
BUN SERPL-MCNC: 10 MG/DL — SIGNIFICANT CHANGE UP (ref 7–23)
CALCIUM SERPL-MCNC: 9 MG/DL — SIGNIFICANT CHANGE UP (ref 8.5–10.1)
CALCIUM SERPL-MCNC: 9.1 MG/DL — SIGNIFICANT CHANGE UP (ref 8.5–10.1)
CHLORIDE SERPL-SCNC: 106 MMOL/L — SIGNIFICANT CHANGE UP (ref 96–108)
CHLORIDE SERPL-SCNC: 107 MMOL/L — SIGNIFICANT CHANGE UP (ref 96–108)
CO2 SERPL-SCNC: 26 MMOL/L — SIGNIFICANT CHANGE UP (ref 22–31)
CO2 SERPL-SCNC: 27 MMOL/L — SIGNIFICANT CHANGE UP (ref 22–31)
CREAT SERPL-MCNC: 0.61 MG/DL — SIGNIFICANT CHANGE UP (ref 0.5–1.3)
CREAT SERPL-MCNC: 0.64 MG/DL — SIGNIFICANT CHANGE UP (ref 0.5–1.3)
EGFR: 113 ML/MIN/1.73M2 — SIGNIFICANT CHANGE UP
EGFR: 113 ML/MIN/1.73M2 — SIGNIFICANT CHANGE UP
EGFR: 115 ML/MIN/1.73M2 — SIGNIFICANT CHANGE UP
EGFR: 115 ML/MIN/1.73M2 — SIGNIFICANT CHANGE UP
EOSINOPHIL # BLD AUTO: 0.12 K/UL — SIGNIFICANT CHANGE UP (ref 0–0.5)
EOSINOPHIL NFR BLD AUTO: 3 % — SIGNIFICANT CHANGE UP (ref 0–6)
GLUCOSE SERPL-MCNC: 95 MG/DL — SIGNIFICANT CHANGE UP (ref 70–99)
GLUCOSE SERPL-MCNC: 95 MG/DL — SIGNIFICANT CHANGE UP (ref 70–99)
HCT VFR BLD CALC: 38.8 % — LOW (ref 39–50)
HGB BLD-MCNC: 12.9 G/DL — LOW (ref 13–17)
IMM GRANULOCYTES NFR BLD AUTO: 0.3 % — SIGNIFICANT CHANGE UP (ref 0–0.9)
LYMPHOCYTES # BLD AUTO: 1.67 K/UL — SIGNIFICANT CHANGE UP (ref 1–3.3)
LYMPHOCYTES # BLD AUTO: 42.3 % — SIGNIFICANT CHANGE UP (ref 13–44)
MAGNESIUM SERPL-MCNC: 1.5 MG/DL — LOW (ref 1.6–2.6)
MCHC RBC-ENTMCNC: 31.6 PG — SIGNIFICANT CHANGE UP (ref 27–34)
MCHC RBC-ENTMCNC: 33.2 G/DL — SIGNIFICANT CHANGE UP (ref 32–36)
MCV RBC AUTO: 95.1 FL — SIGNIFICANT CHANGE UP (ref 80–100)
MONOCYTES # BLD AUTO: 0.39 K/UL — SIGNIFICANT CHANGE UP (ref 0–0.9)
MONOCYTES NFR BLD AUTO: 9.9 % — SIGNIFICANT CHANGE UP (ref 2–14)
NEUTROPHILS # BLD AUTO: 1.74 K/UL — LOW (ref 1.8–7.4)
NEUTROPHILS NFR BLD AUTO: 44 % — SIGNIFICANT CHANGE UP (ref 43–77)
NRBC # BLD: 0 /100 WBCS — SIGNIFICANT CHANGE UP (ref 0–0)
NRBC BLD-RTO: 0 /100 WBCS — SIGNIFICANT CHANGE UP (ref 0–0)
PHOSPHATE SERPL-MCNC: 3.6 MG/DL — SIGNIFICANT CHANGE UP (ref 2.5–4.5)
PLATELET # BLD AUTO: 88 K/UL — LOW (ref 150–400)
POTASSIUM SERPL-MCNC: 3.5 MMOL/L — SIGNIFICANT CHANGE UP (ref 3.5–5.3)
POTASSIUM SERPL-MCNC: 3.5 MMOL/L — SIGNIFICANT CHANGE UP (ref 3.5–5.3)
POTASSIUM SERPL-SCNC: 3.5 MMOL/L — SIGNIFICANT CHANGE UP (ref 3.5–5.3)
POTASSIUM SERPL-SCNC: 3.5 MMOL/L — SIGNIFICANT CHANGE UP (ref 3.5–5.3)
PROT SERPL-MCNC: 7.5 GM/DL — SIGNIFICANT CHANGE UP (ref 6–8.3)
RBC # BLD: 4.08 M/UL — LOW (ref 4.2–5.8)
RBC # FLD: 13.1 % — SIGNIFICANT CHANGE UP (ref 10.3–14.5)
SODIUM SERPL-SCNC: 138 MMOL/L — SIGNIFICANT CHANGE UP (ref 135–145)
SODIUM SERPL-SCNC: 139 MMOL/L — SIGNIFICANT CHANGE UP (ref 135–145)
WBC # BLD: 3.95 K/UL — SIGNIFICANT CHANGE UP (ref 3.8–10.5)
WBC # FLD AUTO: 3.95 K/UL — SIGNIFICANT CHANGE UP (ref 3.8–10.5)

## 2024-08-16 PROCEDURE — 99232 SBSQ HOSP IP/OBS MODERATE 35: CPT

## 2024-08-16 RX ADMIN — Medication 3 MILLIGRAM(S): at 22:51

## 2024-08-16 RX ADMIN — Medication 100 MILLILITER(S): at 08:19

## 2024-08-16 RX ADMIN — ENOXAPARIN SODIUM 40 MILLIGRAM(S): 100 INJECTION SUBCUTANEOUS at 12:04

## 2024-08-17 VITALS
SYSTOLIC BLOOD PRESSURE: 148 MMHG | OXYGEN SATURATION: 99 % | HEART RATE: 65 BPM | DIASTOLIC BLOOD PRESSURE: 89 MMHG | TEMPERATURE: 98 F | RESPIRATION RATE: 18 BRPM

## 2024-08-17 PROCEDURE — 99239 HOSP IP/OBS DSCHRG MGMT >30: CPT

## 2024-08-17 RX ORDER — B1/B2/B3/B5/B6/B12/VIT C/FOLIC 500-0.5 MG
1 TABLET ORAL
Qty: 30 | Refills: 0
Start: 2024-08-17 | End: 2024-09-15

## 2024-08-17 RX ORDER — FOLIC ACID 1 MG/1
1 TABLET ORAL
Qty: 30 | Refills: 0
Start: 2024-08-17 | End: 2024-09-15

## 2024-08-17 RX ADMIN — ENOXAPARIN SODIUM 40 MILLIGRAM(S): 100 INJECTION SUBCUTANEOUS at 11:23

## 2024-08-23 DIAGNOSIS — F10.239 ALCOHOL DEPENDENCE WITH WITHDRAWAL, UNSPECIFIED: ICD-10-CM

## 2024-08-23 DIAGNOSIS — K70.10 ALCOHOLIC HEPATITIS WITHOUT ASCITES: ICD-10-CM

## 2024-08-23 DIAGNOSIS — F10.229 ALCOHOL DEPENDENCE WITH INTOXICATION, UNSPECIFIED: ICD-10-CM

## 2024-08-23 DIAGNOSIS — N40.0 BENIGN PROSTATIC HYPERPLASIA WITHOUT LOWER URINARY TRACT SYMPTOMS: ICD-10-CM
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
## 2024-08-23 DIAGNOSIS — R78.0 FINDING OF ALCOHOL IN BLOOD: ICD-10-CM

## 2024-08-23 DIAGNOSIS — E87.6 HYPOKALEMIA: ICD-10-CM

## 2024-08-23 DIAGNOSIS — R74.01 ELEVATION OF LEVELS OF LIVER TRANSAMINASE LEVELS: ICD-10-CM

## 2024-08-23 DIAGNOSIS — F10.251 ALCOHOL DEPENDENCE WITH ALCOHOL-INDUCED PSYCHOTIC DISORDER WITH HALLUCINATIONS: ICD-10-CM

## 2024-08-23 DIAGNOSIS — E83.42 HYPOMAGNESEMIA: ICD-10-CM
